# Patient Record
Sex: MALE | Race: WHITE | NOT HISPANIC OR LATINO | URBAN - NONMETROPOLITAN AREA
[De-identification: names, ages, dates, MRNs, and addresses within clinical notes are randomized per-mention and may not be internally consistent; named-entity substitution may affect disease eponyms.]

---

## 2023-12-06 ENCOUNTER — TELEPHONE (OUTPATIENT)
Dept: PODIATRY | Facility: CLINIC | Age: 73
End: 2023-12-06
Payer: OTHER GOVERNMENT

## 2023-12-06 NOTE — TELEPHONE ENCOUNTER
Called patient to let him know that we do not go to [place] he was ok with scheduling in [place] he was stating this was an emergency visit although all he needs is a his nail cut. He does not have any open wounds. I told the patient I could fit him in 1/26 @ 3848 he was unhappy with this he wants to be seen sooner. He was referred to us back in February but refused a visit then. He never went to [place] to a podiatrist. I tried to explain that was the first place I could put him for nail care he was unhappy but took the appointment.

## 2024-01-18 NOTE — PROGRESS NOTES
[facility] - PODIATRY    Today's Date: 01/26/2024     Patient Name: Eric Chavez Jr.  MRN: [id number]  [facility]: [id number]  PCP: Provider, No Known  Referring Provider: Mulugeta Villalpando PA    SUBJECTIVE     Chief Complaint   Patient presents with   • Establish Care     Mulugeta Villalpando PA  [facility]-pt states he is here today for nail care/swelling in feet and ankles/patient has vision issues which he is treated by [place]-pt reports pain on occasions it comes and goes     HPI: Eric Chavez Jr., a 73 y.o.male, comes to clinic as a(n) new patient complaining of toenail/callus issues. Patient has h/o multiple finger amputations, anxiety, AVN hip, chronic hepatitis, frostbite of hands, GERD, depression, personality disorder, OA, venous insufficiency . Patient is non-diabetic. Admits to numbness in feet. Denies open wounds or sores. Notes that his toenails are long, thick and discolored. He has difficulty caring for his feet due to left eye blindness and multiple finger amputations. Admits pain at 5/10 level and described as burning, aching, numbness, and tingling. Denies previous treatment. Denies any constitutional symptoms. No other pedal complaints at this time.    Past Medical History:   Diagnosis Date   • Abnormal weight loss    • Acquired absence of finger of left hand    • Acquired absence of finger of right hand    • Allergic rhinitis    • Anxiety    • Avascular necrosis of bone of hip    • BPH (benign prostatic hyperplasia)    • Chronic hepatitis, unspecified    • Closed fracture of tibia and fibula    • Epidermal cyst    • Frostbite of hand    • GERD (gastroesophageal reflux disease)    • Intracerebral hemorrhage, nontraumatic    • Low back pain    • Major depressive disorder    • Personality disorder    • Post-traumatic osteoarthritis of ankle    • Post-traumatic osteoarthritis of left foot    • Retinal detachment    • Traumatic arthropathy of hip    • Tributary  (branch) retinal vein occlusion, right eye, with macular edema    • Unilateral primary osteoarthritis, left knee    • Venous insufficiency (chronic) (peripheral)      Past Surgical History:   Procedure Laterality Date   • AMPUTATION     • AMPUTATION FINGER / THUMB     • COLONOSCOPY     • HERNIA REPAIR       History reviewed. No pertinent family history.  Social History     Socioeconomic History   • Marital status: Single   Tobacco Use   • Smoking status: Some Days     Types: Cigars     Passive exposure: Current   • Smokeless tobacco: Never   Vaping Use   • Vaping Use: Never used   Substance and Sexual Activity   • Alcohol use: Not Currently     Comment: very rare   • Drug use: Defer   • Sexual activity: Defer     Allergies   Allergen Reactions   • Clindamycin Dermatitis and Unknown - Low Severity   • Cephalexin Rash     Current Outpatient Medications   Medication Sig Dispense Refill   • docusate sodium 100 MG capsule Take 1 capsule twice a day by oral route.     • dorzolamide-timolol (COSOPT) 2-0.5 % ophthalmic solution 1 drop.     • loratadine (CLARITIN) 10 MG tablet Take 1 tablet by mouth Daily.       No current facility-administered medications for this visit.     Review of Systems   Constitutional:  Negative for chills and fever.   HENT:  Negative for congestion.    Eyes:         Left eye patch   Respiratory:  Negative for shortness of breath.    Cardiovascular:  Negative for chest pain and leg swelling.   Gastrointestinal:  Negative for constipation, diarrhea, nausea and vomiting.   Musculoskeletal:  Positive for arthralgias.        Multiple finger amputations   Skin:  Negative for wound.   Neurological:  Positive for numbness.       OBJECTIVE     Vitals:    01/26/24 1311   BP: 126/88       PHYSICAL EXAM  GEN:   Accompanied by none.     Foot/Ankle Exam    GENERAL  Appearance:  appears stated age  Orientation:  AAOx3  Affect:  appropriate  Gait:  unimpaired  Assistance:  independent  Right shoe gear: casual  shoe  Left shoe gear: casual shoe    VASCULAR     Right Foot Vascularity   Dorsalis pedis:  2+  Posterior tibial:  2+  Skin temperature:  warm  Edema grading:  None  CFT:  3  Pedal hair growth:  Present  Varicosities:  none     Left Foot Vascularity   Dorsalis pedis:  1+  Posterior tibial:  1+  Skin temperature:  warm  Edema grading:  None  CFT:  4  Pedal hair growth:  Present  Varicosities:  none     NEUROLOGIC     Right Foot Neurologic   Light touch sensation: diminished  Vibratory sensation: diminished  Hot/Cold sensation: diminished  Protective Sensation using [place]em[place]atha Monofilament:   Sites intact: 3  Sites tested: 10     Left Foot Neurologic   Light touch sensation: diminished  Vibratory sensation: diminished  Hot/Cold sensation:  diminished  Protective Sensation using [place]em[place]atha Monofilament:   Left Foot Sites Intact: 0.  Sites tested: 10    MUSCULOSKELETAL     Right Foot Musculoskeletal   Ecchymosis:  none  Tenderness:  toenail problem    Arch:  Normal     Left Foot Musculoskeletal   Ecchymosis:  none  Tenderness:  toenail problem  Arch:  Normal    MUSCLE STRENGTH     Right Foot Muscle Strength   Foot dorsiflexion:  4+  Foot plantar flexion:  4+  Foot inversion:  4+  Foot eversion:  4+     Left Foot Muscle Strength   Foot dorsiflexion:  4+  Foot plantar flexion:  4+  Foot inversion:  4+  Foot eversion:  4+    RANGE OF MOTION     Right Foot Range of Motion   Foot and ankle ROM within normal limits       Left Foot Range of Motion   Foot and ankle ROM within normal limits      DERMATOLOGIC      Right Foot Dermatologic   Skin  Right foot skin is intact.   Nails  1.  Positive for elongated, onychomycosis, abnormal thickness, subungual debris and dystrophic nail.  2.  Positive for elongated, onychomycosis, abnormal thickness and subungual debris.  3.  Positive for elongated, onychomycosis, abnormal thickness and subungual debris.  4.  Positive for elongated, onychomycosis, abnormal thickness and  subungual debris.  5.  Positive for elongated, onychomycosis, abnormal thickness and subungual debris.     Left Foot Dermatologic   Skin  Positive for corn.   Nails  1.  Positive for elongated, onychomycosis, abnormal thickness, subungual debris and dystrophic nail.  2.  Positive for elongated, onychomycosis, abnormal thickness and subungual debris.  3.  Positive for elongated, onychomycosis, abnormal thickness and subungual debris.  4.  Positive for elongated, onychomycosis, abnormally thick and subungual debris.  5.  Positive for elongated, onychomycosis, abnormally thick and subungual debris.    Image:       RADIOLOGY/NUCLEAR:  B-Scan Ultrasound - OD - Right Eye    Result Date: 1/18/2024  Narrative: This result has an attachment that is not available. Attached, SB     LABORATORY/CULTURE RESULTS:      PATHOLOGY RESULTS:       ASSESSMENT/PLAN     Diagnoses and all orders for this visit:    1. Onychomycosis (Primary)    2. Polyneuropathy    3. Foot callus    4. Foot pain, bilateral    5. Amputation of finger, sequela    6. Self-care deficit      Comprehensive lower extremity examination and evaluation was performed.  Discussed findings and treatment plan including risks, benefits, and treatment options with patient in detail. Patient agreed with treatment plan.  After verbal consent obtained, nail(s) x10 debrided of length and thickness with nail nipper without incidence  After verbal consent obtained, calluses x1 pared utilizing dermal curette and/or scalpel without incidence  Patient may maintain nails and calluses at home utilizing emery board or pumice stone between visits as needed   An After Visit Summary was printed and given to the patient at discharge, including (if requested) any available informative/educational handouts regarding diagnosis, treatment, or medications. All questions were answered to patient/family satisfaction. Should symptoms fail to improve or worsen they agree to call or return to  clinic or to go to the Emergency Department. Discussed the importance of following up with any needed screening tests/labs/specialist appointments and any requested follow-up recommended by me today. Importance of maintaining follow-up discussed and patient accepts that missed appointments can delay diagnosis and potentially lead to worsening of conditions.  Return in about 3 months (around 4/26/2024) for Follow-up with Podiatry APRN, Schedule Foot Care Clinic., or sooner if acute issues arise.        This document has been electronically signed by Aron Song DPM on January 26, 2024 13:37 CST

## 2024-01-25 ENCOUNTER — TELEPHONE (OUTPATIENT)
Dept: PODIATRY | Facility: CLINIC | Age: 74
End: 2024-01-25
Payer: OTHER GOVERNMENT

## 2024-01-26 ENCOUNTER — OFFICE VISIT (OUTPATIENT)
Dept: PODIATRY | Facility: CLINIC | Age: 74
End: 2024-01-26
Payer: OTHER GOVERNMENT

## 2024-01-26 VITALS
WEIGHT: 167.8 LBS | SYSTOLIC BLOOD PRESSURE: 126 MMHG | BODY MASS INDEX: 22.24 KG/M2 | HEIGHT: 73 IN | DIASTOLIC BLOOD PRESSURE: 88 MMHG

## 2024-01-26 DIAGNOSIS — L84 FOOT CALLUS: ICD-10-CM

## 2024-01-26 DIAGNOSIS — M79.672 FOOT PAIN, BILATERAL: ICD-10-CM

## 2024-01-26 DIAGNOSIS — B35.1 ONYCHOMYCOSIS: Primary | ICD-10-CM

## 2024-01-26 DIAGNOSIS — S68.119S AMPUTATION OF FINGER, SEQUELA: ICD-10-CM

## 2024-01-26 DIAGNOSIS — G62.9 POLYNEUROPATHY: ICD-10-CM

## 2024-01-26 DIAGNOSIS — Z78.9 SELF-CARE DEFICIT: ICD-10-CM

## 2024-01-26 DIAGNOSIS — M79.671 FOOT PAIN, BILATERAL: ICD-10-CM

## 2024-01-26 PROBLEM — M25.559 GREATER TROCHANTERIC PAIN SYNDROME: Status: ACTIVE | Noted: 2024-01-26

## 2024-01-26 PROBLEM — K21.9 GERD (GASTROESOPHAGEAL REFLUX DISEASE): Status: ACTIVE | Noted: 2022-12-23

## 2024-01-26 PROBLEM — Z59.819 HOUSING SITUATION UNSTABLE: Status: ACTIVE | Noted: 2023-01-14

## 2024-01-26 PROBLEM — T33.521A FROSTBITE OF BOTH HANDS: Status: ACTIVE | Noted: 2022-12-23

## 2024-01-26 PROBLEM — S68.119A AMPUTATION FINGER: Status: ACTIVE | Noted: 2024-01-26

## 2024-01-26 PROBLEM — H53.9 VISUAL DISTURBANCE: Status: ACTIVE | Noted: 2023-01-03

## 2024-01-26 PROBLEM — T33.522A FROSTBITE OF BOTH HANDS: Status: ACTIVE | Noted: 2022-12-23

## 2024-01-26 RX ORDER — LORATADINE 10 MG/1
10 TABLET ORAL DAILY
COMMUNITY

## 2024-01-26 RX ORDER — PSEUDOEPHEDRINE HCL 30 MG
TABLET ORAL
COMMUNITY

## 2024-01-26 RX ORDER — DORZOLAMIDE HYDROCHLORIDE AND TIMOLOL MALEATE 20; 5 MG/ML; MG/ML
1 SOLUTION/ DROPS OPHTHALMIC
COMMUNITY
Start: 2023-11-30 | End: 2024-11-30

## 2024-04-10 ENCOUNTER — TELEPHONE (OUTPATIENT)
Dept: PODIATRY | Facility: CLINIC | Age: 74
End: 2024-04-10
Payer: OTHER GOVERNMENT

## 2024-05-17 NOTE — PROGRESS NOTES
[facility] - PODIATRY    Today's Date: 05/22/2024     Patient Name: Eric Chavez Jr.  MRN: [id number]  CSN: [id number]  PCP: Provider, No Known  Referring Provider: No ref. provider found    SUBJECTIVE     Chief Complaint   Patient presents with    Follow-up     Mulugeta Villalpando PA - f/u for nail/foot care-pt states he is here today for nail/foot care-pt denies pain     HPI: Eric Chavez Jr., a 73 y.o.male, comes to clinic as a(n) new patient complaining of toenail/callus issues. Patient has h/o multiple finger amputations, anxiety, AVN hip, chronic hepatitis, frostbite of hands, GERD, depression, personality disorder, OA, venous insufficiency . Patient is non-diabetic. Continues to report numbness in feet. Denies open wounds or sores today. Today he reports that his toenails are long, thick and discolored. He has difficulty caring for his feet due to left eye blindness and multiple finger amputations. Denies pain. He has no new complaints or issues today. Denies previous treatment. Denies any constitutional symptoms. No other pedal complaints at this time.    Past Medical History:   Diagnosis Date    Abnormal weight loss     Acquired absence of finger of left hand     Acquired absence of finger of right hand     Allergic rhinitis     Anxiety     Avascular necrosis of bone of hip     BPH (benign prostatic hyperplasia)     Chronic hepatitis, unspecified     Closed fracture of tibia and fibula     Epidermal cyst     Frostbite of hand     GERD (gastroesophageal reflux disease)     Intracerebral hemorrhage, nontraumatic     Low back pain     Major depressive disorder     Personality disorder     Post-traumatic osteoarthritis of ankle     Post-traumatic osteoarthritis of left foot     Retinal detachment     Traumatic arthropathy of hip     Tributary (branch) retinal vein occlusion, right eye, with macular edema     Unilateral primary osteoarthritis, left knee     Venous insufficiency (chronic)  (peripheral)      Past Surgical History:   Procedure Laterality Date    AMPUTATION      AMPUTATION FINGER / THUMB      COLONOSCOPY      HERNIA REPAIR       History reviewed. No pertinent family history.  Social History     Socioeconomic History    Marital status: Single   Tobacco Use    Smoking status: Some Days     Types: Cigars     Passive exposure: Current    Smokeless tobacco: Never   Vaping Use    Vaping status: Never Used   Substance and Sexual Activity    Alcohol use: Not Currently     Comment: very rare    Drug use: Defer    Sexual activity: Defer     Allergies   Allergen Reactions    Clindamycin Dermatitis and Unknown - Low Severity    Cephalexin Rash     Current Outpatient Medications   Medication Sig Dispense Refill    docusate sodium 100 MG capsule Take 1 capsule twice a day by oral route.      dorzolamide-timolol (COSOPT) 2-0.5 % ophthalmic solution 1 drop.      loratadine (CLARITIN) 10 MG tablet Take 1 tablet by mouth Daily.      multivitamin with minerals (MULTIVITAMIN ADULT PO) Take 1 tablet by mouth Daily.       No current facility-administered medications for this visit.     Review of Systems   Constitutional:  Negative for chills and fever.   HENT:  Negative for congestion.    Eyes:         Left eye patch   Respiratory:  Negative for shortness of breath.    Cardiovascular:  Negative for chest pain and leg swelling.   Gastrointestinal:  Negative for constipation, diarrhea, nausea and vomiting.   Musculoskeletal:  Positive for arthralgias.        Multiple finger amputations. Occasional foot pain   Skin:  Negative for wound.   Neurological:  Positive for numbness.   Hematological:  Does not bruise/bleed easily.   Psychiatric/Behavioral:  Negative for agitation and behavioral problems.        OBJECTIVE     Vitals:    05/22/24 1312   BP: 122/80         PHYSICAL EXAM  GEN:   Accompanied by none.     Foot/Ankle Exam    GENERAL  Appearance:  appears stated age  Orientation:  AAOx3  Affect:   appropriate  Gait:  unimpaired  Assistance:  independent  Right shoe gear: casual shoe  Left shoe gear: casual shoe    VASCULAR     Right Foot Vascularity   Dorsalis pedis:  2+  Posterior tibial:  2+  Skin temperature:  warm  Edema grading:  None  CFT:  3  Pedal hair growth:  Present  Varicosities:  none     Left Foot Vascularity   Dorsalis pedis:  1+  Posterior tibial:  1+  Skin temperature:  warm  Edema grading:  None  CFT:  4  Pedal hair growth:  Present  Varicosities:  none     NEUROLOGIC     Right Foot Neurologic   Light touch sensation: diminished  Vibratory sensation: diminished  Hot/Cold sensation: diminished  Protective Sensation using [place]-Hemalatha Monofilament:   Sites intact: 3  Sites tested: 10     Left Foot Neurologic   Light touch sensation: diminished  Vibratory sensation: diminished  Hot/Cold sensation:  diminished  Protective Sensation using [place]-Hemalatha Monofilament:   Left Foot Sites Intact: 0.  Sites tested: 10    MUSCULOSKELETAL     Right Foot Musculoskeletal   Ecchymosis:  none  Tenderness:  toenail problem    Arch:  Normal     Left Foot Musculoskeletal   Ecchymosis:  none  Tenderness:  toenail problem  Arch:  Normal    MUSCLE STRENGTH     Right Foot Muscle Strength   Foot dorsiflexion:  4+  Foot plantar flexion:  4+  Foot inversion:  4+  Foot eversion:  4+     Left Foot Muscle Strength   Foot dorsiflexion:  4+  Foot plantar flexion:  4+  Foot inversion:  4+  Foot eversion:  4+    RANGE OF MOTION     Right Foot Range of Motion   Foot and ankle ROM within normal limits       Left Foot Range of Motion   Foot and ankle ROM within normal limits      DERMATOLOGIC      Right Foot Dermatologic   Skin  Right foot skin is intact.   Nails  1.  Positive for elongated, onychomycosis, abnormal thickness, subungual debris and dystrophic nail.  2.  Positive for elongated, onychomycosis, abnormal thickness and subungual debris.  3.  Positive for elongated, onychomycosis, abnormal thickness and  subungual debris.  4.  Positive for elongated, onychomycosis, abnormal thickness and subungual debris.  5.  Positive for elongated, onychomycosis, abnormal thickness and subungual debris.     Left Foot Dermatologic   Skin  Positive for corn.   Nails  1.  Positive for elongated, onychomycosis, abnormal thickness, subungual debris and dystrophic nail.  2.  Positive for elongated, onychomycosis, abnormal thickness and subungual debris.  3.  Positive for elongated, onychomycosis, abnormal thickness and subungual debris.  4.  Positive for elongated, onychomycosis, abnormally thick and subungual debris.  5.  Positive for elongated, onychomycosis, abnormally thick and subungual debris.    Image:       RADIOLOGY/NUCLEAR:  No results found.    LABORATORY/CULTURE RESULTS:      PATHOLOGY RESULTS:       ASSESSMENT/PLAN     Diagnoses and all orders for this visit:    1. Onychomycosis (Primary)    2. Polyneuropathy    3. Foot callus    4. Foot pain, bilateral    5. Amputation of finger, sequela    6. Self-care deficit        Comprehensive lower extremity examination and evaluation was performed.  Discussed findings and treatment plan including risks, benefits, and treatment options with patient in detail. Patient agreed with treatment plan.  After verbal consent obtained, nail(s) x10 debrided of length and thickness with nail nipper without incidence  After verbal consent obtained, calluses x3 pared utilizing dermal curette and/or scalpel without incidence  Patient may maintain nails and calluses at home utilizing emery board or pumice stone between visits as needed     An After Visit Summary was printed and given to the patient at discharge, including (if requested) any available informative/educational handouts regarding diagnosis, treatment, or medications. All questions were answered to patient/family satisfaction. Should symptoms fail to improve or worsen they agree to call or return to clinic or to go to the Emergency  Department. Discussed the importance of following up with any needed screening tests/labs/specialist appointments and any requested follow-up recommended by me today. Importance of maintaining follow-up discussed and patient accepts that missed appointments can delay diagnosis and potentially lead to worsening of conditions.  Return in about 2 months (around 7/24/2024) for Follow-up with APRN, Follow-up in [facility] Clinic., or sooner if acute issues arise.        This document has been electronically signed by VIOLA Lo on May 22, 2024 16:16 CDT

## 2024-05-21 ENCOUNTER — TELEPHONE (OUTPATIENT)
Dept: PODIATRY | Facility: CLINIC | Age: 74
End: 2024-05-21
Payer: OTHER GOVERNMENT

## 2024-05-21 NOTE — TELEPHONE ENCOUNTER
Hub to relay   Spoke with patient regarding appt on 05/22/2024. Patient confirmed date and time off appt.

## 2024-05-22 ENCOUNTER — OFFICE VISIT (OUTPATIENT)
Dept: PODIATRY | Facility: CLINIC | Age: 74
End: 2024-05-22
Payer: OTHER GOVERNMENT

## 2024-05-22 VITALS
BODY MASS INDEX: 20.67 KG/M2 | SYSTOLIC BLOOD PRESSURE: 122 MMHG | WEIGHT: 156 LBS | HEIGHT: 73 IN | DIASTOLIC BLOOD PRESSURE: 80 MMHG

## 2024-05-22 DIAGNOSIS — Z78.9 SELF-CARE DEFICIT: ICD-10-CM

## 2024-05-22 DIAGNOSIS — G62.9 POLYNEUROPATHY: ICD-10-CM

## 2024-05-22 DIAGNOSIS — M79.671 FOOT PAIN, BILATERAL: ICD-10-CM

## 2024-05-22 DIAGNOSIS — S68.119S AMPUTATION OF FINGER, SEQUELA: ICD-10-CM

## 2024-05-22 DIAGNOSIS — M79.672 FOOT PAIN, BILATERAL: ICD-10-CM

## 2024-05-22 DIAGNOSIS — B35.1 ONYCHOMYCOSIS: Primary | ICD-10-CM

## 2024-05-22 DIAGNOSIS — L84 FOOT CALLUS: ICD-10-CM

## 2024-08-14 ENCOUNTER — OFFICE VISIT (OUTPATIENT)
Age: 74
End: 2024-08-14
Payer: OTHER GOVERNMENT

## 2024-08-14 VITALS
BODY MASS INDEX: 20.67 KG/M2 | SYSTOLIC BLOOD PRESSURE: 122 MMHG | WEIGHT: 156 LBS | HEIGHT: 73 IN | DIASTOLIC BLOOD PRESSURE: 78 MMHG

## 2024-08-14 DIAGNOSIS — M79.672 FOOT PAIN, BILATERAL: ICD-10-CM

## 2024-08-14 DIAGNOSIS — Z78.9 SELF-CARE DEFICIT: ICD-10-CM

## 2024-08-14 DIAGNOSIS — M79.671 FOOT PAIN, BILATERAL: ICD-10-CM

## 2024-08-14 DIAGNOSIS — L84 FOOT CALLUS: ICD-10-CM

## 2024-08-14 DIAGNOSIS — B35.1 ONYCHOMYCOSIS: Primary | ICD-10-CM

## 2024-08-14 DIAGNOSIS — G62.9 POLYNEUROPATHY: ICD-10-CM

## 2024-10-07 NOTE — PROGRESS NOTES
[facility] - PODIATRY    Today's Date: 10/09/2024     Patient Name: Eric Chavez Jr.  MRN: [id number]  CSN: [id number]  PCP: Provider, No Known  Referring Provider: No ref. provider found    SUBJECTIVE     Chief Complaint   Patient presents with    Follow-up     Mulugeta Villalpando PA 07/30/2024  Return in about 2 months- pt states feet are numb at times and affects balance. Other than that feet doing ok, nails need trimmed- pt denies pain     HPI: Eric Chavez Jr., a 73 y.o.male, comes to clinic as a(n) established patient complaining of toenail/callus issues. Patient has h/o multiple finger amputations, anxiety, AVN hip, chronic hepatitis, frostbite of hands, GERD, depression, personality disorder, OA, venous insufficiency . Patient is non-diabetic.  Continues to relay a moderate amount of numbness in feet.  Relates this can occasionally affect his balance.  He does have increased concern about the numbness. Denies open wounds or sores today.  Reports today that his toenails are long, thick and discolored.  Has difficulty caring for his feet due to left eye blindness and multiple finger amputations. Denies pain currently but relays calluses have returned and can be painful at times.  Denies previous treatment. Denies any constitutional symptoms. No other pedal complaints at this time.    Past Medical History:   Diagnosis Date    Abnormal weight loss     Acquired absence of finger of left hand     Acquired absence of finger of right hand     Allergic rhinitis     Anxiety     Avascular necrosis of bone of hip     BPH (benign prostatic hyperplasia)     Chronic hepatitis, unspecified     Closed fracture of tibia and fibula     Epidermal cyst     Frostbite of hand     GERD (gastroesophageal reflux disease)     Intracerebral hemorrhage, nontraumatic     Low back pain     Major depressive disorder     Personality disorder     Post-traumatic osteoarthritis of ankle     Post-traumatic osteoarthritis of  left foot     Retinal detachment     Traumatic arthropathy of hip     Tributary (branch) retinal vein occlusion, right eye, with macular edema     Unilateral primary osteoarthritis, left knee     Venous insufficiency (chronic) (peripheral)      Past Surgical History:   Procedure Laterality Date    AMPUTATION      AMPUTATION FINGER / THUMB      COLONOSCOPY      HERNIA REPAIR       History reviewed. No pertinent family history.  Social History     Socioeconomic History    Marital status: Single   Tobacco Use    Smoking status: Some Days     Types: Cigars     Passive exposure: Current    Smokeless tobacco: Never   Vaping Use    Vaping status: Never Used   Substance and Sexual Activity    Alcohol use: Not Currently     Comment: very rare    Drug use: Defer    Sexual activity: Defer     Allergies   Allergen Reactions    Clindamycin Dermatitis and Unknown - Low Severity    Cephalexin Rash     Current Outpatient Medications   Medication Sig Dispense Refill    docusate sodium 100 MG capsule Take 1 capsule twice a day by oral route.      dorzolamide-timolol (COSOPT) 2-0.5 % ophthalmic solution 1 drop.      loratadine (CLARITIN) 10 MG tablet Take 1 tablet by mouth Daily.      multivitamin with minerals (MULTIVITAMIN ADULT PO) Take 1 tablet by mouth Daily.       No current facility-administered medications for this visit.     Review of Systems   Constitutional:  Negative for chills and fever.   HENT:  Negative for congestion.    Eyes:         Left eye patch   Respiratory:  Negative for shortness of breath.    Cardiovascular:  Negative for chest pain and leg swelling.   Gastrointestinal:  Negative for constipation, diarrhea, nausea and vomiting.   Musculoskeletal:  Positive for arthralgias.        Multiple finger amputations. Occasional foot pain   Skin:  Negative for wound.        Thickened, elongated, irregular toenails.  Calluses.   Neurological:  Positive for numbness.   Hematological:  Does not bruise/bleed easily.    Psychiatric/Behavioral:  Negative for agitation and behavioral problems.        OBJECTIVE     Vitals:    10/09/24 1334   BP: 124/66             PHYSICAL EXAM  GEN:   Accompanied by none.     Foot/Ankle Exam    GENERAL  Appearance:  appears stated age  Orientation:  AAOx3  Affect:  appropriate  Gait:  unimpaired  Assistance:  independent  Right shoe gear: casual shoe  Left shoe gear: casual shoe    VASCULAR     Right Foot Vascularity   Dorsalis pedis:  2+  Posterior tibial:  2+  Skin temperature:  cool  Edema grading:  Trace  CFT:  3  Pedal hair growth:  Present  Varicosities:  none     Left Foot Vascularity   Dorsalis pedis:  1+  Posterior tibial:  1+  Skin temperature:  cold  Edema gradin+ and pitting  CFT:  4  Pedal hair growth:  Present  Varicosities:  none     NEUROLOGIC     Right Foot Neurologic   Light touch sensation: diminished  Vibratory sensation: diminished  Hot/Cold sensation: diminished  Protective Sensation using Sebastian-Hemalatha Monofilament:   Sites intact: 3  Sites tested: 10     Left Foot Neurologic   Light touch sensation: diminished  Vibratory sensation: diminished  Hot/Cold sensation:  diminished  Protective Sensation using Sebastian-Hemalatha Monofilament:   Left Foot Sites Intact: 0.  Sites tested: 10    MUSCULOSKELETAL     Right Foot Musculoskeletal   Ecchymosis:  none  Tenderness:  toenail problem    Arch:  Normal     Left Foot Musculoskeletal   Ecchymosis:  none  Tenderness:  toenail problem  Arch:  Normal    MUSCLE STRENGTH     Right Foot Muscle Strength   Foot dorsiflexion:  4+  Foot plantar flexion:  4+  Foot inversion:  4+  Foot eversion:  4+     Left Foot Muscle Strength   Foot dorsiflexion:  4+  Foot plantar flexion:  4+  Foot inversion:  4+  Foot eversion:  4+    RANGE OF MOTION     Right Foot Range of Motion   Foot and ankle ROM within normal limits       Left Foot Range of Motion   Foot and ankle ROM within normal limits      DERMATOLOGIC      Right Foot Dermatologic    Skin  Positive for corn.   Nails  1.  Positive for elongated, onychomycosis, abnormal thickness, subungual debris and dystrophic nail.  2.  Positive for elongated, onychomycosis, abnormal thickness and subungual debris.  3.  Positive for elongated, onychomycosis, abnormal thickness and subungual debris.  4.  Positive for elongated, onychomycosis, abnormal thickness and subungual debris.  5.  Positive for elongated, onychomycosis, abnormal thickness and subungual debris.     Left Foot Dermatologic   Skin  Positive for corn.   Nails  1.  Positive for elongated, onychomycosis, abnormal thickness, subungual debris and dystrophic nail.  2.  Positive for elongated, onychomycosis, abnormal thickness and subungual debris.  3.  Positive for elongated, onychomycosis, abnormal thickness and subungual debris.  4.  Positive for elongated, onychomycosis, abnormally thick and subungual debris.  5.  Positive for elongated, onychomycosis, abnormally thick and subungual debris.    Image:       RADIOLOGY/NUCLEAR:  No results found.    LABORATORY/CULTURE RESULTS:      PATHOLOGY RESULTS:       ASSESSMENT/PLAN     Diagnoses and all orders for this visit:    1. Onychomycosis (Primary)    2. Polyneuropathy    3. Foot callus    4. Foot pain, bilateral    5. Bilateral lower extremity edema  -     US Ankle / Brachial Indices Extremity Complete; Future  -     US Venous Doppler Lower Extremity Bilateral (duplex); Future    6. Self-care deficit        Comprehensive lower extremity examination and evaluation was performed.  Discussed findings and treatment plan including risks, benefits, and treatment options with patient in detail. Patient agreed with treatment plan.  After verbal consent obtained, nail(s) x10 debrided of length and thickness with nail nipper without incidence  After verbal consent obtained, calluses x5 pared utilizing dermal curette and/or scalpel without incidence  Patient may maintain nails and calluses at home utilizing  emery board or pumice stone between visits as needed   Patient expressing increased concerns about numbness to bilateral feet. Discussed with patient that diabetes, back issues, and chemo/radiation therapy are some of the larger causes of neuropathy. Patient states he does have a history of back issues and notices numbness increases while lying down. Recommend patient follow-up with neuro-surgeon for chronic back issues.  Order placed for both SHIRA and Venous duplex with VVI placed today.   Referral to Vascular Surgery today for further evaluation of blood flow.   Recommended patient utilize compression stockings and to keep feet elevated while at rest.  Patient to return in 63 days for routine care.  Encouraged patient to call sooner with questions or concerns.    An After Visit Summary was printed and given to the patient at discharge, including (if requested) any available informative/educational handouts regarding diagnosis, treatment, or medications. All questions were answered to patient/family satisfaction. Should symptoms fail to improve or worsen they agree to call or return to clinic or to go to the Emergency Department. Discussed the importance of following up with any needed screening tests/labs/specialist appointments and any requested follow-up recommended by me today. Importance of maintaining follow-up discussed and patient accepts that missed appointments can delay diagnosis and potentially lead to worsening of conditions.  Return in about 2 months (around 12/11/2024) for Follow-up with APRN, Follow[facility]up in [facility] or sooner if acute issues arise.      Advance Care Planning   ACP discussion was declined by the patient. Patient does not have an advance directive, declines further assistance.       This document has been electronically signed by VIOLA Lo on October 9, 2024 14:09 CDT

## 2024-10-09 ENCOUNTER — OFFICE VISIT (OUTPATIENT)
Age: 74
End: 2024-10-09
Payer: OTHER GOVERNMENT

## 2024-10-09 VITALS
DIASTOLIC BLOOD PRESSURE: 66 MMHG | WEIGHT: 156 LBS | SYSTOLIC BLOOD PRESSURE: 124 MMHG | BODY MASS INDEX: 20.67 KG/M2 | HEIGHT: 73 IN

## 2024-10-09 DIAGNOSIS — R60.0 BILATERAL LOWER EXTREMITY EDEMA: ICD-10-CM

## 2024-10-09 DIAGNOSIS — M79.672 FOOT PAIN, BILATERAL: ICD-10-CM

## 2024-10-09 DIAGNOSIS — L84 FOOT CALLUS: ICD-10-CM

## 2024-10-09 DIAGNOSIS — Z78.9 SELF-CARE DEFICIT: ICD-10-CM

## 2024-10-09 DIAGNOSIS — M79.671 FOOT PAIN, BILATERAL: ICD-10-CM

## 2024-10-09 DIAGNOSIS — B35.1 ONYCHOMYCOSIS: Primary | ICD-10-CM

## 2024-10-09 DIAGNOSIS — G62.9 POLYNEUROPATHY: ICD-10-CM
